# Patient Record
(demographics unavailable — no encounter records)

---

## 2025-07-03 NOTE — HISTORY OF PRESENT ILLNESS
[6] : 6 [2] : 2 [Sharp] : sharp [Tightness] : tightness [Ice] : ice [] : no [FreeTextEntry1] : right leg [FreeTextEntry4] : 6/5/2025 [FreeTextEntry5] : Patient had a fall on 6/5, patient is complaining of right knee and right foot pain.  [FreeTextEntry6] : tightness around the knee, aching pain around the ankle [de-identified] : weightbearing

## 2025-07-03 NOTE — DISCUSSION/SUMMARY
[de-identified] : right ankle pain  Possible lucency 5th met but patient moving too much during x ray and ambulating for 3 weeks  The patient was advised of the diagnosis, and it is recommended that the patient follow up with an orthopedic physician within 7 days.     Plan of care:  KALEB indicated for Meloxicam 15mg daily prn but patient declined,  she will continue to ice.  This was communicated to the patient and family members with understanding.  Right Knee Pain  + McMurrays  decreased ROM I am concerned that patient has a torn meniscus and may require Arthroscopic surgical intervention if positive on MRI.      Patient was given Rx for MRI of the Right KNEE    to further evaluate meniscus injury that is suspected due to physical examination. and may need arthroscopic surgical intervention.Patient also reports history decreased ROM and strength, and a feeling of instability associated with this body part. Patient was instructed to f/u after imaging for review. The patient was advised of the diagnosis, and it is recommended that the patient follow up after MRI with an orthopedic physician within 7 days.    Plan of care:  [facility]  MRI  NSAIDS  This was communicated to the patient and family members with understanding.  [phone]

## 2025-07-03 NOTE — HISTORY OF PRESENT ILLNESS
[6] : 6 [2] : 2 [Sharp] : sharp [Tightness] : tightness [Ice] : ice [] : no [FreeTextEntry1] : right leg [FreeTextEntry4] : 6/5/2025 [FreeTextEntry5] : Patient had a fall on 6/5, patient is complaining of right knee and right foot pain.  [FreeTextEntry6] : tightness around the knee, aching pain around the ankle [de-identified] : weightbearing

## 2025-07-03 NOTE — HISTORY OF PRESENT ILLNESS
[6] : 6 [2] : 2 [Sharp] : sharp [Tightness] : tightness [Ice] : ice [] : no [FreeTextEntry1] : right leg [FreeTextEntry4] : 6/5/2025 [FreeTextEntry5] : Patient had a fall on 6/5, patient is complaining of right knee and right foot pain.  [FreeTextEntry6] : tightness around the knee, aching pain around the ankle [de-identified] : weightbearing

## 2025-07-03 NOTE — PHYSICAL EXAM
[AP] : anteroposterior [Lateral] : lateral [[place]] : skyline [AP Standing] : anteroposterior standing [There are no fractures, subluxations or dislocations. No significant abnormalities are seen] : There are no fractures, subluxations or dislocations. No significant abnormalities are seen [Moderate patellofemoral OA] : Moderate patellofemoral OA [Weight -] : weightbearing [Right] : right foot and ankle [Moderate] : moderate diffused ankle swelling [5___] : [facility] 5[unfilled]/5 [] : no achilles tendon tenderness [FreeTextEntry9] :   5th metatarsal luceny but maybe due to patient moving in x ray or over lapping of bones I obtained and independently interpreted the AP/LATERAL/Oblique x ray from today 6/28/2025.       [TWNoteComboBox7] : dorsiflexion 15 degrees [de-identified] : plantar flexion 30 degrees

## 2025-07-03 NOTE — DISCUSSION/SUMMARY
[de-identified] : right ankle pain  Possible lucency 5th met but patient moving too much during x ray and ambulating for 3 weeks  The patient was advised of the diagnosis, and it is recommended that the patient follow up with an orthopedic physician within 7 days.     Plan of care:  KALEB indicated for Meloxicam 15mg daily prn but patient declined,  she will continue to ice.  This was communicated to the patient and family members with understanding.  Right Knee Pain  + McMurrays  decreased ROM I am concerned that patient has a torn meniscus and may require Arthroscopic surgical intervention if positive on MRI.      Patient was given Rx for MRI of the Right KNEE    to further evaluate meniscus injury that is suspected due to physical examination. and may need arthroscopic surgical intervention.Patient also reports history decreased ROM and strength, and a feeling of instability associated with this body part. Patient was instructed to f/u after imaging for review. The patient was advised of the diagnosis, and it is recommended that the patient follow up after MRI with an orthopedic physician within 7 days.    Plan of care:  [facility]  MRI  NSAIDS  This was communicated to the patient and family members with understanding.  [phone]

## 2025-07-03 NOTE — PHYSICAL EXAM
[AP] : anteroposterior [Lateral] : lateral [[place]] : skyline [AP Standing] : anteroposterior standing [There are no fractures, subluxations or dislocations. No significant abnormalities are seen] : There are no fractures, subluxations or dislocations. No significant abnormalities are seen [Moderate patellofemoral OA] : Moderate patellofemoral OA [Weight -] : weightbearing [Right] : right foot and ankle [Moderate] : moderate diffused ankle swelling [5___] : [facility] 5[unfilled]/5 [] : no achilles tendon tenderness [FreeTextEntry9] :   5th metatarsal luceny but maybe due to patient moving in x ray or over lapping of bones I obtained and independently interpreted the AP/LATERAL/Oblique x ray from today 6/28/2025.       [TWNoteComboBox7] : dorsiflexion 15 degrees [de-identified] : plantar flexion 30 degrees

## 2025-07-03 NOTE — PHYSICAL EXAM
[AP] : anteroposterior [Lateral] : lateral [[place]] : skyline [AP Standing] : anteroposterior standing [There are no fractures, subluxations or dislocations. No significant abnormalities are seen] : There are no fractures, subluxations or dislocations. No significant abnormalities are seen [Moderate patellofemoral OA] : Moderate patellofemoral OA [Weight -] : weightbearing [Right] : right foot and ankle [Moderate] : moderate diffused ankle swelling [5___] : [facility] 5[unfilled]/5 [] : no achilles tendon tenderness [FreeTextEntry9] :   5th metatarsal luceny but maybe due to patient moving in x ray or over lapping of bones I obtained and independently interpreted the AP/LATERAL/Oblique x ray from today 6/28/2025.       [TWNoteComboBox7] : dorsiflexion 15 degrees [de-identified] : plantar flexion 30 degrees

## 2025-07-03 NOTE — DISCUSSION/SUMMARY
[de-identified] : right ankle pain  Possible lucency 5th met but patient moving too much during x ray and ambulating for 3 weeks  The patient was advised of the diagnosis, and it is recommended that the patient follow up with an orthopedic physician within 7 days.     Plan of care:  KALEB indicated for Meloxicam 15mg daily prn but patient declined,  she will continue to ice.  This was communicated to the patient and family members with understanding.  Right Knee Pain  + McMurrays  decreased ROM I am concerned that patient has a torn meniscus and may require Arthroscopic surgical intervention if positive on MRI.      Patient was given Rx for MRI of the Right KNEE    to further evaluate meniscus injury that is suspected due to physical examination. and may need arthroscopic surgical intervention.Patient also reports history decreased ROM and strength, and a feeling of instability associated with this body part. Patient was instructed to f/u after imaging for review. The patient was advised of the diagnosis, and it is recommended that the patient follow up after MRI with an orthopedic physician within 7 days.    Plan of care:  [facility]  MRI  NSAIDS  This was communicated to the patient and family members with understanding.  [phone]

## 2025-07-14 NOTE — HISTORY OF PRESENT ILLNESS
[de-identified] : 07/14/2025: The patient is a 73 year old F, who presents today complaining of right knee pain. Date of Injury/Onset: 6/5/2025 Pain:    At Rest: 0/10 With Activity:  0/10 Mechanism of injury: Patient stood up and the rug under her slipped causing her to fall down.  This is not a Work Related Injury being treated under Worker's Compensation. This is not an athletic injury occurring associated with an interscholastic or organized sports team. Quality of symptoms: swelling, tightness Improves with: ice, elevation Worse with: prolonged walking, at night Prior treatment: [facility] ([facility]) 6/28/2025 - XR, MRI  Prior imaging: XR [facility] 6/28/2025, MRI [facility] 7/8/2025 Previous injury: [None] School/Sport/Position/Occupation: housewife Additional Information: [None]

## 2025-07-14 NOTE — IMAGING
[de-identified] : RIGHT KNEE Inspection: mild effusion; swelling over PF joint Palpation: medial joint line tenderness, anterior tenderness Knee Range of Motion: 3-125  Strength: 5/5 Quadriceps strength, 5/5 Hamstring strength Neurological: light touch is intact throughout Ligament Stability and Special Tests:  McMurrays: neg Lachman: neg Pivot Shift: neg Posterior Drawer: neg Valgus: neg Varus: neg Patella Apprehension: neg Patella Maltracking: neg  [Right] : right knee [There are no fractures, subluxations or dislocations. No significant abnormalities are seen] : There are no fractures, subluxations or dislocations. No significant abnormalities are seen [Degenerative change] : Degenerative change [Moderate tricompartmental OA medial narrowing] : Moderate tricompartmental OA medial narrowing [Moderate patellofemoral OA] : Moderate patellofemoral OA

## 2025-07-14 NOTE — DISCUSSION/SUMMARY
[de-identified] : Assessment:   The patient is a 73 year old female with physical exam findings consistent with RIGHT KNEE OSTEOARTHRITIS, HEMATOMA, and MMT    - We discussed their diagnosis and treatment options at length including the risks and benefits of both surgical and nonsurgical options. - We will continue conservative treatment with activity modification, PT, icing, weight loss, and anti-inflammatory medications. - The patient was provided with a PT prescription to work on ROM, hip ER/abductors strengthening, quad/hamstring stretches and strengthening, and other exercises. - The patient was advised to let pain guide the gradual advancement of activities.   Follow up as needed in 6-8 weeks as to re-evaluate

## 2025-07-14 NOTE — DATA REVIEWED
[MRI] : MRI [Right] : of the right [Knee] : knee [Report was reviewed and noted in the chart] : The report was reviewed and noted in the chart [I independently reviewed and interpreted images and report] : I independently reviewed and interpreted images and report [I reviewed the films/CD] : I reviewed the films/CD [FreeTextEntry1] :  MRI 07/08/25 Right Knee: 1. Mucoid degeneration of the anterior cruciate ligament with preserved fiber continuity (Series 3, Image 21). 2. Inferior flap tear of the body of the medial meniscus with associated medial meniscal extrusion (Series 6, Image 13). 3. Severe mucoid degeneration and thinning of the anterior horn and root of the lateral meniscus (Series 4, Image 11). 4. Mucoid degeneration of the body and posterior horn of the medial meniscus (Series 4, Image 25). 5. Multifocal cartilage thinning and degeneration involving the lateral patellar  facet and lateral femoral condyle anteriorly (Series 3, Image 13).Marginal osteophytes involving the tibiofemoral and patellofemoral compartments, with intercondylar eminence spiking. These are suggestive of degenerative changes. 6. Extensive joint effusion, including suprapatellar, prepatellar, and infrapatellar bursae (Series 4, Image 24). 7. Moderate anterior subcutaneous edema.